# Patient Record
Sex: FEMALE | Race: ASIAN | URBAN - METROPOLITAN AREA
[De-identification: names, ages, dates, MRNs, and addresses within clinical notes are randomized per-mention and may not be internally consistent; named-entity substitution may affect disease eponyms.]

---

## 2024-10-30 ENCOUNTER — APPOINTMENT (RX ONLY)
Dept: URBAN - METROPOLITAN AREA CLINIC 107 | Facility: CLINIC | Age: 69
Setting detail: DERMATOLOGY
End: 2024-10-30

## 2024-10-30 DIAGNOSIS — L81.1 CHLOASMA: ICD-10-CM

## 2024-10-30 DIAGNOSIS — L65.8 OTHER SPECIFIED NONSCARRING HAIR LOSS: ICD-10-CM

## 2024-10-30 PROCEDURE — 99203 OFFICE O/P NEW LOW 30 MIN: CPT

## 2024-10-30 PROCEDURE — ? PRESCRIPTION

## 2024-10-30 PROCEDURE — ? COUNSELING

## 2024-10-30 PROCEDURE — ? TREATMENT REGIMEN

## 2024-10-30 ASSESSMENT — LOCATION ZONE DERM
LOCATION ZONE: SCALP
LOCATION ZONE: FACE

## 2024-10-30 ASSESSMENT — LOCATION SIMPLE DESCRIPTION DERM
LOCATION SIMPLE: RIGHT CHEEK
LOCATION SIMPLE: POSTERIOR SCALP

## 2024-10-30 ASSESSMENT — LOCATION DETAILED DESCRIPTION DERM
LOCATION DETAILED: RIGHT SUPERIOR CENTRAL MALAR CHEEK
LOCATION DETAILED: POSTERIOR MID-PARIETAL SCALP

## 2024-10-30 NOTE — PROCEDURE: TREATMENT REGIMEN
Detail Level: Zone
Initiate Treatment: Minox 6%/finast 0.1%/tret 0.025%/HC 1% \\nQuantity: 30.0 g  Days Supply: 60\\n0.1% finasteride (bulk), 1% hydrocortisone (bulk), 6% minoxidil (bulk), 0.025% tretinoin (bulk)\\nSig: Apply to scalp once a day.
Initiate Treatment: DeLight Hydroquinone 5%, Kojic 2%, VitC 2.5%, Tretinoin 0.025%, Hydrocortisone 1%, HA 0.1% \\nQuantity: 15.0 g\\nDeLight Hydroquinone 5%, Kojic 2%, VitC 2.5%, Tretinoin 0.025%, Hydrocortisone 1%, HA 0.1% (2.5% Ayla-C)\\nSig: Apply to AA dark spots QOHS-QHS 3 months on/1 month off.

## 2024-10-31 ENCOUNTER — APPOINTMENT (RX ONLY)
Dept: URBAN - METROPOLITAN AREA CLINIC 107 | Facility: CLINIC | Age: 69
Setting detail: DERMATOLOGY
End: 2024-10-31

## 2025-02-06 ENCOUNTER — APPOINTMENT (OUTPATIENT)
Dept: URBAN - METROPOLITAN AREA CLINIC 107 | Facility: CLINIC | Age: 70
Setting detail: DERMATOLOGY
End: 2025-02-06

## 2025-02-06 DIAGNOSIS — L81.1 CHLOASMA: ICD-10-CM | Status: IMPROVED

## 2025-02-06 DIAGNOSIS — L65.8 OTHER SPECIFIED NONSCARRING HAIR LOSS: ICD-10-CM | Status: IMPROVED

## 2025-02-06 PROCEDURE — ? TREATMENT REGIMEN

## 2025-02-06 PROCEDURE — 99213 OFFICE O/P EST LOW 20 MIN: CPT

## 2025-02-06 PROCEDURE — ? COUNSELING

## 2025-02-06 PROCEDURE — ? PRESCRIPTION

## 2025-02-06 RX ORDER — SPIRONOLACTONE 100 MG/1
TABLET, FILM COATED ORAL
Qty: 90 | Refills: 1 | Status: ERX | COMMUNITY
Start: 2025-02-06

## 2025-02-06 RX ADMIN — SPIRONOLACTONE: 100 TABLET, FILM COATED ORAL at 00:00

## 2025-02-06 ASSESSMENT — LOCATION DETAILED DESCRIPTION DERM
LOCATION DETAILED: POSTERIOR MID-PARIETAL SCALP
LOCATION DETAILED: RIGHT SUPERIOR CENTRAL MALAR CHEEK

## 2025-02-06 ASSESSMENT — LOCATION SIMPLE DESCRIPTION DERM
LOCATION SIMPLE: POSTERIOR SCALP
LOCATION SIMPLE: RIGHT CHEEK

## 2025-02-06 ASSESSMENT — LOCATION ZONE DERM
LOCATION ZONE: FACE
LOCATION ZONE: SCALP

## 2025-02-06 NOTE — PROCEDURE: TREATMENT REGIMEN
Continue Regimen: Minox 6%/finast 0.1%/tret 0.025%/HC 1% \\nQuantity: 30.0 g  Days Supply: 60\\n0.1% finasteride (bulk), 1% hydrocortisone (bulk), 6% minoxidil (bulk), 0.025% tretinoin (bulk)\\nSig: Apply to scalp once a day.
Detail Level: Zone
Initiate Treatment: spironolactone 100 mg tablet \\nQuantity: 90.0 Tablet  Days Supply: 90\\nSig: Take one tablet every morning
Plan: Take one month off before beginning regimen again for 3 months
Continue Regimen: DeLight Hydroquinone 5%, Kojic 2%, VitC 2.5%, Tretinoin 0.025%, Hydrocortisone 1%, HA 0.1% \\nQuantity: 15.0 g\\nDeLight Hydroquinone 5%, Kojic 2%, VitC 2.5%, Tretinoin 0.025%, Hydrocortisone 1%, HA 0.1% (2.5% Ayla-C)\\nSig: Apply to AA dark spots QOHS-QHS 3 months on/1 month off.

## 2025-05-07 ENCOUNTER — APPOINTMENT (OUTPATIENT)
Dept: URBAN - METROPOLITAN AREA CLINIC 107 | Facility: CLINIC | Age: 70
Setting detail: DERMATOLOGY
End: 2025-05-07

## 2025-05-07 DIAGNOSIS — L65.8 OTHER SPECIFIED NONSCARRING HAIR LOSS: ICD-10-CM

## 2025-05-07 DIAGNOSIS — L81.1 CHLOASMA: ICD-10-CM

## 2025-05-07 PROCEDURE — ? TREATMENT REGIMEN

## 2025-05-07 PROCEDURE — ? COUNSELING

## 2025-05-07 PROCEDURE — 99213 OFFICE O/P EST LOW 20 MIN: CPT

## 2025-05-07 PROCEDURE — ? PRESCRIPTION

## 2025-05-07 RX ORDER — MINOXIDIL 2.5 MG/1
TABLET ORAL DAILY
Qty: 90 | Refills: 1 | Status: ERX | COMMUNITY
Start: 2025-05-07

## 2025-05-07 RX ORDER — SPIRONOLACTONE 100 MG/1
TABLET, FILM COATED ORAL
Qty: 90 | Refills: 1 | Status: CANCELLED
Stop reason: CLARIF

## 2025-05-07 RX ADMIN — MINOXIDIL: 2.5 TABLET ORAL at 00:00

## 2025-05-07 ASSESSMENT — LOCATION ZONE DERM
LOCATION ZONE: FACE
LOCATION ZONE: SCALP

## 2025-05-07 ASSESSMENT — LOCATION DETAILED DESCRIPTION DERM
LOCATION DETAILED: POSTERIOR MID-PARIETAL SCALP
LOCATION DETAILED: MID-FRONTAL SCALP
LOCATION DETAILED: RIGHT SUPERIOR CENTRAL MALAR CHEEK

## 2025-05-07 ASSESSMENT — LOCATION SIMPLE DESCRIPTION DERM
LOCATION SIMPLE: RIGHT CHEEK
LOCATION SIMPLE: ANTERIOR SCALP
LOCATION SIMPLE: POSTERIOR SCALP

## 2025-05-07 NOTE — PROCEDURE: TREATMENT REGIMEN
Continue Regimen: Minox 6%/finast 0.1%/tret 0.025%/HC 1% \\nQuantity: 30.0 g  Days Supply: 60\\n0.1% finasteride (bulk), 1% hydrocortisone (bulk), 6% minoxidil (bulk), 0.025% tretinoin (bulk)\\nSig: Apply to scalp once a day.
Plan: Topical started in January.
Detail Level: Zone
Modify Regimen: spironolactone 100 mg tablet \\nQuantity: 90.0 Tablet  Days Supply: 90\\nSig: Take 1/2 tablet every morning. (PCP recommend)
Initiate Treatment: minoxidil 2.5 mg tablet Daily\\nQuantity: 90.0 Tablet  Days Supply: 90\\nSig: Take 1/4 tab daily with food and water
Plan: Patient concerned about dark circles underneath eyes. Recommend applying tp on areas of concern.
Continue Regimen: DeLight Hydroquinone 5%, Kojic 2%, VitC 2.5%, Tretinoin 0.025%, Hydrocortisone 1%, HA 0.1% \\nQuantity: 15.0 g\\nDeLight Hydroquinone 5%, Kojic 2%, VitC 2.5%, Tretinoin 0.025%, Hydrocortisone 1%, HA 0.1% (2.5% Ayla-C)\\nSig: Apply to AA dark spots QOHS-QHS 3 months on/1 month off.

## 2025-06-17 ENCOUNTER — RX ONLY (RX ONLY)
Age: 70
End: 2025-06-17

## 2025-06-17 RX ORDER — MINOXIDIL 2.5 MG/1
TABLET ORAL DAILY
Qty: 90 | Refills: 2 | Status: ERX